# Patient Record
Sex: FEMALE | Race: WHITE | URBAN - METROPOLITAN AREA
[De-identification: names, ages, dates, MRNs, and addresses within clinical notes are randomized per-mention and may not be internally consistent; named-entity substitution may affect disease eponyms.]

---

## 2021-07-07 ENCOUNTER — E-VISIT (OUTPATIENT)
Dept: PRIMARY CARE CLINIC | Age: 26
End: 2021-07-07

## 2021-07-07 DIAGNOSIS — R05.9 COUGH: Primary | ICD-10-CM

## 2021-07-07 PROCEDURE — 99421 OL DIG E/M SVC 5-10 MIN: CPT | Performed by: NURSE PRACTITIONER

## 2021-07-07 RX ORDER — ALBUTEROL SULFATE 90 UG/1
2 AEROSOL, METERED RESPIRATORY (INHALATION) EVERY 6 HOURS PRN
Qty: 1 INHALER | Refills: 3 | Status: SHIPPED | OUTPATIENT
Start: 2021-07-07

## 2021-07-07 ASSESSMENT — LIFESTYLE VARIABLES: SMOKING_STATUS: NO, I'VE NEVER SMOKED

## 2021-07-07 NOTE — PROGRESS NOTES
Reviewed questionnaire    Reviewed meds/allergies    Dx Cough    Plan Rx given for proair, follow up with PCP if no improvement in symptoms with use of med    Time spent on visit 5 min

## 2022-12-30 DIAGNOSIS — R05.9 COUGH: ICD-10-CM

## 2022-12-30 RX ORDER — ALBUTEROL SULFATE 90 UG/1
AEROSOL, METERED RESPIRATORY (INHALATION)
Qty: 8.5 EACH | Refills: 3 | OUTPATIENT
Start: 2022-12-30